# Patient Record
Sex: FEMALE | Race: OTHER | HISPANIC OR LATINO
[De-identification: names, ages, dates, MRNs, and addresses within clinical notes are randomized per-mention and may not be internally consistent; named-entity substitution may affect disease eponyms.]

---

## 2024-01-22 PROBLEM — Z00.00 ENCOUNTER FOR PREVENTIVE HEALTH EXAMINATION: Status: ACTIVE | Noted: 2024-01-22

## 2024-01-24 DIAGNOSIS — M25.561 PAIN IN RIGHT KNEE: ICD-10-CM

## 2024-01-24 DIAGNOSIS — M25.562 PAIN IN RIGHT KNEE: ICD-10-CM

## 2024-01-25 ENCOUNTER — APPOINTMENT (OUTPATIENT)
Age: 79
End: 2024-01-25
Payer: COMMERCIAL

## 2024-01-25 VITALS
HEART RATE: 58 BPM | WEIGHT: 154 LBS | OXYGEN SATURATION: 97 % | DIASTOLIC BLOOD PRESSURE: 81 MMHG | SYSTOLIC BLOOD PRESSURE: 167 MMHG | BODY MASS INDEX: 30.23 KG/M2 | HEIGHT: 60 IN

## 2024-01-25 PROCEDURE — 99204 OFFICE O/P NEW MOD 45 MIN: CPT | Mod: 25

## 2024-01-25 PROCEDURE — 73564 X-RAY EXAM KNEE 4 OR MORE: CPT | Mod: 50

## 2024-01-25 PROCEDURE — 20611 DRAIN/INJ JOINT/BURSA W/US: CPT | Mod: 50

## 2024-01-25 RX ORDER — HYALURONATE SODIUM 20 MG/2 ML
20 SYRINGE (ML) INTRAARTICULAR
Qty: 6 | Refills: 0 | Status: ACTIVE | COMMUNITY
Start: 2024-01-25

## 2024-01-26 NOTE — PHYSICAL EXAM
[de-identified] : Knee (bilateral)   Inspection  Skin: normal  Effusion: Mild Bursa swelling: none   Palpation  Tenderness: Significant Location: Medial medial joint line  Crepitus: none.  Defect: none.  Popliteal fullness: negative.   Flexion  Active ROM: normal  Passive ROM: normal    Extension  Normal     Straight Leg Raise- can perform  Motor strength  Flexion: 5/5  Extension: 5/5   Sensory index  Normal.   ACL/PCL tests  Lachman test: stable  Anterior drawer: stable  Posterior drawer: stable   MCL/LCL tests  MCL laxity: stable  LCL laxity: stable   Patellofemoral tests  Patellar grind test: Positive Patellar apprehension: negative   Meniscal tests  Bianca's test: normal  [de-identified] : XR of bilateral knees Date: 1/25/2024   Views: 4 views Performed at [facility]: Yes Impression: Significant joint space narrowing in the medial compartment and patellofemoral compartments bilaterally with significant sclerotic changes and osteophytic formations overall consistent with severe tricompartmental osteoarthritis  These images were personally reviewed with original findings documented as above.

## 2024-01-26 NOTE — PROCEDURE
[de-identified] : Ultrasound Guided Injection   Indication: Ensure placement within the intra-articular knee joint utilizing the Sonosite portable ultrasound machine, the Linear 25mm 15-4 MHz transducer, sterile ultrasound gel, ultrasound guidance with the probe in short axis to the joint , utilizing an in-plane approach, was used for the following injection:    Injection: Right intra-articualr knee joint.  Indication: Knee osteoarthritis.  A discussion was had with the patient regarding this procedure and all questions were answered. All risks, benefits and alternatives were discussed. These included but were not limited to bleeding, infection, and allergic reaction. A timeout was performed prior to the procedure to ensure proper side.  Chlorhexidine was used to sterilize the skin overlying the knee joint.  A 21-gauge needle was used to inject 1cc of 0.5% Ropivacaine, 1cc 1% Lidocaine, 1cc of 40mg/mL Depo-Medrol into the joint from a superolateral approach. A sterile bandage was then applied. The patient tolerated the procedure well and there were no complications.  Ultrasound Guided Injection   Indication: Ensure placement within the intra-articular knee joint utilizing the Sonosite portable ultrasound machine, the Linear 25mm 15-4 MHz transducer, sterile ultrasound gel, ultrasound guidance with the probe in short axis to the joint , utilizing an in-plane approach, was used for the following injection:    Injection: Left intra-articualr knee joint.  Indication: Knee osteoarthritis.  A discussion was had with the patient regarding this procedure and all questions were answered. All risks, benefits and alternatives were discussed. These included but were not limited to bleeding, infection, and allergic reaction. A timeout was performed prior to the procedure to ensure proper side.  Chlorhexidine was used to sterilize the skin overlying the knee joint.  A 21-gauge needle was used to inject 1cc of 0.5% Ropivacaine, 1cc 1% Lidocaine, 1cc of 40mg/mL Depo-Medrol into the joint from a superolateral approach. A sterile bandage was then applied. The patient tolerated the procedure well and there were no complications.

## 2024-01-26 NOTE — HISTORY OF PRESENT ILLNESS
[de-identified] : DAMON GONZALEZ is a 78 year old female presenting with acute on chronic bilateral knee pain.  She is here today with her  and another family member.  They states she has had bilateral medial and anterior knee pain for many years with recent worsening.  No fall or trauma related to the knee pain.  In terms of treatment she has tried physical therapy and exercises on and off for the last few years with minimal relief.  They believe she had a steroid injection over a year ago which was helpful.  She has not tried viscosupplement injections or PRP.  They states she knows she has osteoarthritis but does not know the degree of the arthritis.  Denies any catching locking buckling of the knee.  Here today for further evaluation

## 2024-01-26 NOTE — DISCUSSION/SUMMARY
[de-identified] : DAMON GONZALEZ is a 78 year old female presenting with acute on chronic bilateral knee pain due to severe osteoarthritis.  She has had this pain over the last few years with on and off treatment including physical therapy and possibly 1 steroid injection that was helpful.  Now with recent exacerbation without any specific trauma.  Discussed with her and her family that her arthritis is significant and she would likely benefit from total knee replacements.  They would like to continue trialing conservative measures to see if these will relieve her pain.  She understands steroid injections being done today will postpone any surgical intervention that can be done for 3 months.  Overall recommend the followin.  Bilateral knee CSI's performed today as above 2.  Updated referral for physical therapy given 3.  Hyaluronic acid injections ordered, patient will follow-up for injection

## 2024-04-04 ENCOUNTER — APPOINTMENT (OUTPATIENT)
Age: 79
End: 2024-04-04
Payer: COMMERCIAL

## 2024-04-04 VITALS
WEIGHT: 157 LBS | BODY MASS INDEX: 30.82 KG/M2 | DIASTOLIC BLOOD PRESSURE: 83 MMHG | HEART RATE: 68 BPM | OXYGEN SATURATION: 95 % | SYSTOLIC BLOOD PRESSURE: 149 MMHG | HEIGHT: 60 IN

## 2024-04-04 PROCEDURE — 20611 DRAIN/INJ JOINT/BURSA W/US: CPT | Mod: 50

## 2024-04-04 NOTE — PROCEDURE
[de-identified] : Ultrasound Guided Injection   Indication: Ensure placement within the intra-articular knee joint utilizing the Sonosite portable ultrasound machine, the Linear 25mm 15-4 MHz transducer, sterile ultrasound gel, ultrasound guidance with the probe in short axis to the joint , utilizing an in-plane approach, was used for the following injection:    Injection: RIGHT intra-articular knee joint.  Indication: Knee osteoarthritis.  A discussion was had with the patient regarding this procedure and all questions were answered. All risks, benefits and alternatives were discussed. These included but were not limited to bleeding, infection, and allergic reaction. A timeout was performed prior to the procedure to ensure proper side.  Chlorhexidine was used to sterilize the skin overlying the knee joint.  A 21-gauge needle was used to inject 2 cc Euflexxa into the joint from a superolateral approach. A sterile bandage was then applied. The patient tolerated the procedure well and there were no complications.  Ultrasound Guided Injection   Indication: Ensure placement within the intra-articular knee joint utilizing the Sonosite portable ultrasound machine, the Linear 25mm 15-4 MHz transducer, sterile ultrasound gel, ultrasound guidance with the probe in short axis to the joint , utilizing an in-plane approach, was used for the following injection:    Injection: LEFT intra-articualr knee joint.  Indication: Knee osteoarthritis.  A discussion was had with the patient regarding this procedure and all questions were answered. All risks, benefits and alternatives were discussed. These included but were not limited to bleeding, infection, and allergic reaction. A timeout was performed prior to the procedure to ensure proper side.  Chlorhexidine was used to sterilize the skin overlying the knee joint.  A 21-gauge needle was used to inject 2 cc Euflexxa into the joint from a superolateral approach. A sterile bandage was then applied. The patient tolerated the procedure well and there were no complications.

## 2024-04-11 ENCOUNTER — APPOINTMENT (OUTPATIENT)
Age: 79
End: 2024-04-11
Payer: MEDICAID

## 2024-04-11 VITALS
SYSTOLIC BLOOD PRESSURE: 157 MMHG | HEIGHT: 60 IN | HEART RATE: 59 BPM | BODY MASS INDEX: 30.82 KG/M2 | WEIGHT: 157 LBS | OXYGEN SATURATION: 94 % | DIASTOLIC BLOOD PRESSURE: 76 MMHG

## 2024-04-11 PROCEDURE — 20611 DRAIN/INJ JOINT/BURSA W/US: CPT | Mod: 50

## 2024-04-11 NOTE — PROCEDURE
[de-identified] : Ultrasound Guided Injection   Indication: Ensure placement within the intra-articular knee joint utilizing the Sonosite portable ultrasound machine, the Linear 25mm 15-4 MHz transducer, sterile ultrasound gel, ultrasound guidance with the probe in short axis to the joint , utilizing an in-plane approach, was used for the following injection:    Injection: RIGHT intra-articular knee joint.  Indication: Knee osteoarthritis.  A discussion was had with the patient regarding this procedure and all questions were answered. All risks, benefits and alternatives were discussed. These included but were not limited to bleeding, infection, and allergic reaction. A timeout was performed prior to the procedure to ensure proper side.  Chlorhexidine was used to sterilize the skin overlying the knee joint.  A 21-gauge needle was used to inject 2 cc Euflexxa into the joint from a superolateral approach. A sterile bandage was then applied. The patient tolerated the procedure well and there were no complications.  Ultrasound Guided Injection   Indication: Ensure placement within the intra-articular knee joint utilizing the Sonosite portable ultrasound machine, the Linear 25mm 15-4 MHz transducer, sterile ultrasound gel, ultrasound guidance with the probe in short axis to the joint , utilizing an in-plane approach, was used for the following injection:    Injection: LEFT intra-articualr knee joint.  Indication: Knee osteoarthritis.  A discussion was had with the patient regarding this procedure and all questions were answered. All risks, benefits and alternatives were discussed. These included but were not limited to bleeding, infection, and allergic reaction. A timeout was performed prior to the procedure to ensure proper side.  Chlorhexidine was used to sterilize the skin overlying the knee joint.  A 21-gauge needle was used to inject 2 cc Euflexxa into the joint from a superolateral approach. A sterile bandage was then applied. The patient tolerated the procedure well and there were no complications.

## 2024-04-17 ENCOUNTER — APPOINTMENT (OUTPATIENT)
Age: 79
End: 2024-04-17
Payer: COMMERCIAL

## 2024-04-17 VITALS
BODY MASS INDEX: 30.82 KG/M2 | HEIGHT: 60 IN | HEART RATE: 59 BPM | DIASTOLIC BLOOD PRESSURE: 85 MMHG | OXYGEN SATURATION: 95 % | WEIGHT: 157 LBS | SYSTOLIC BLOOD PRESSURE: 155 MMHG

## 2024-04-17 DIAGNOSIS — M17.0 BILATERAL PRIMARY OSTEOARTHRITIS OF KNEE: ICD-10-CM

## 2024-04-17 PROCEDURE — 20611 DRAIN/INJ JOINT/BURSA W/US: CPT | Mod: 50

## 2024-04-17 NOTE — PROCEDURE
[de-identified] : Ultrasound Guided Injection Indication: Ensure placement within the intra-articular knee joint utilizing the Sonosite portable ultrasound machine, the Linear 25mm 15-4 MHz transducer, sterile ultrasound gel, ultrasound guidance with the probe in short axis to the joint , utilizing an in-plane approach, was used for the following injection: Injection: RIGHT intra-articular knee joint. Indication: Knee osteoarthritis. A discussion was had with the patient regarding this procedure and all questions were answered. All risks, benefits and alternatives were discussed. These included but were not limited to bleeding, infection, and allergic reaction. A timeout was performed prior to the procedure to ensure proper side. Chlorhexidine was used to sterilize the skin overlying the knee joint. A 21-gauge needle was used to inject 2 cc Euflexxa into the joint from a superolateral approach. A sterile bandage was then applied. The patient tolerated the procedure well and there were no complications.  Ultrasound Guided Injection Indication: Ensure placement within the intra-articular knee joint utilizing the Sonosite portable ultrasound machine, the Linear 25mm 15-4 MHz transducer, sterile ultrasound gel, ultrasound guidance with the probe in short axis to the joint , utilizing an in-plane approach, was used for the following injection: Injection: LEFT intra-articualr knee joint. Indication: Knee osteoarthritis. A discussion was had with the patient regarding this procedure and all questions were answered. All risks, benefits and alternatives were discussed. These included but were not limited to bleeding, infection, and allergic reaction. A timeout was performed prior to the procedure to ensure proper side. Chlorhexidine was used to sterilize the skin overlying the knee joint. A 21-gauge needle was used to inject 2 cc Euflexxa into the joint from a superolateral approach. A sterile bandage was then applied. The patient tolerated the procedure well and there were no complications.  Updated physical therapy referral given to start as soon as possible and she will follow-up with me in 6 to 12 months for repeat injections as needed

## 2025-07-09 ENCOUNTER — APPOINTMENT (OUTPATIENT)
Age: 80
End: 2025-07-09
Payer: COMMERCIAL

## 2025-07-09 PROCEDURE — 99214 OFFICE O/P EST MOD 30 MIN: CPT

## 2025-07-09 PROCEDURE — 73030 X-RAY EXAM OF SHOULDER: CPT | Mod: RT
